# Patient Record
Sex: FEMALE | Race: WHITE | Employment: FULL TIME | URBAN - METROPOLITAN AREA
[De-identification: names, ages, dates, MRNs, and addresses within clinical notes are randomized per-mention and may not be internally consistent; named-entity substitution may affect disease eponyms.]

---

## 2017-01-10 ENCOUNTER — ALLSCRIPTS OFFICE VISIT (OUTPATIENT)
Dept: OTHER | Facility: OTHER | Age: 47
End: 2017-01-10

## 2017-03-31 ENCOUNTER — ALLSCRIPTS OFFICE VISIT (OUTPATIENT)
Dept: OTHER | Facility: OTHER | Age: 47
End: 2017-03-31

## 2017-04-06 ENCOUNTER — LAB CONVERSION - ENCOUNTER (OUTPATIENT)
Dept: OTHER | Facility: OTHER | Age: 47
End: 2017-04-06

## 2017-04-06 LAB
ADDITIONAL INFORMATION (HISTORICAL): NORMAL
ADEQUACY: (HISTORICAL): NORMAL
COMMENT (HISTORICAL): NORMAL
CYTOTECHNOLOGIST: (HISTORICAL): NORMAL
HPV MRNA E6/E7 (HISTORICAL): NOT DETECTED
INTERPRETATION (HISTORICAL): NORMAL
LMP (HISTORICAL): NORMAL
PREV. BX: (HISTORICAL): NORMAL
PREV. PAP (HISTORICAL): NORMAL
SOURCE (HISTORICAL): NORMAL

## 2017-04-14 ENCOUNTER — GENERIC CONVERSION - ENCOUNTER (OUTPATIENT)
Dept: OTHER | Facility: OTHER | Age: 47
End: 2017-04-14

## 2017-04-30 DIAGNOSIS — Z12.31 ENCOUNTER FOR SCREENING MAMMOGRAM FOR MALIGNANT NEOPLASM OF BREAST: ICD-10-CM

## 2018-01-13 VITALS
HEIGHT: 59 IN | WEIGHT: 128.5 LBS | DIASTOLIC BLOOD PRESSURE: 78 MMHG | SYSTOLIC BLOOD PRESSURE: 116 MMHG | BODY MASS INDEX: 25.91 KG/M2

## 2018-01-15 VITALS
WEIGHT: 150 LBS | BODY MASS INDEX: 30.24 KG/M2 | DIASTOLIC BLOOD PRESSURE: 82 MMHG | HEIGHT: 59 IN | SYSTOLIC BLOOD PRESSURE: 120 MMHG

## 2018-03-20 ENCOUNTER — OFFICE VISIT (OUTPATIENT)
Dept: OBGYN CLINIC | Facility: CLINIC | Age: 48
End: 2018-03-20
Payer: COMMERCIAL

## 2018-03-20 VITALS
SYSTOLIC BLOOD PRESSURE: 122 MMHG | WEIGHT: 122.8 LBS | HEIGHT: 60 IN | BODY MASS INDEX: 24.11 KG/M2 | DIASTOLIC BLOOD PRESSURE: 78 MMHG

## 2018-03-20 DIAGNOSIS — Z12.39 BREAST CANCER SCREENING: ICD-10-CM

## 2018-03-20 DIAGNOSIS — Z01.419 WOMEN'S ANNUAL ROUTINE GYNECOLOGICAL EXAMINATION: Primary | ICD-10-CM

## 2018-03-20 PROCEDURE — 99396 PREV VISIT EST AGE 40-64: CPT | Performed by: OBSTETRICS & GYNECOLOGY

## 2018-03-20 RX ORDER — OMEPRAZOLE 40 MG/1
40 CAPSULE, DELAYED RELEASE ORAL DAILY
Refills: 2 | COMMUNITY
Start: 2018-01-23

## 2018-03-20 RX ORDER — BIOTIN 1 MG
TABLET ORAL
COMMUNITY

## 2018-03-20 RX ORDER — PNV NO.95/FERROUS FUM/FOLIC AC 28MG-0.8MG
TABLET ORAL
COMMUNITY

## 2018-03-20 RX ORDER — UBIDECARENONE 75 MG
CAPSULE ORAL
COMMUNITY

## 2018-03-20 NOTE — PROGRESS NOTES
Subjective  HPI:     Adeel Serrano is a 50 y o  female  She is a  2 Para 2, with 2 prior  sections  Her menstrual cycles are regular and predictable, heavy for one day lasting 3 days  Her current method of contraception includes tubal ligation  She denies hot flashes, night sweats, vaginal dryness  She has been experiencing some sleep disturbances  She denies issues with intimacy  She denies Gu/ Gyn complaints  Has been experiening loose stools  She has been seen by GI all testing is benign  She denies depression/anxiety  She denies any new medical/family stressors/illnesses to report   Her dental care is up-to-date  Gynecologic History    Patient's last menstrual period was 2018 (exact date)  Last Pap: 3/31/18  Results were: normal  Last mammogram: 17  Results were: normal  Colonoscopy in 2017 - Normal      Obstetric History    OB History    Para Term  AB Living   2 2       2   SAB TAB Ectopic Multiple Live Births           2      # Outcome Date GA Lbr Yasmany/2nd Weight Sex Delivery Anes PTL Lv   2 Para 99    F CS-LTranv      1 Para 03/15/96    F CS-LTranv             The following portions of the patient's history were reviewed and updated as appropriate: allergies, current medications, past family history, past medical history, past social history, past surgical history and problem list     Review of Systems    Pertinent items are noted in HPI  Objective    Physical Exam   Constitutional: Vital signs are normal  She appears well-developed and well-nourished  Genitourinary: Vagina normal and uterus normal  Pelvic exam was performed with patient supine  There is no rash, tenderness, lesion or Bartholin's cyst on the right labia  There is no rash, tenderness, lesion or Bartholin's cyst on the left labia  Right adnexum does not display mass, does not display tenderness and does not display fullness   Left adnexum does not display mass, does not display tenderness and does not display fullness  Cervix is not parous  Cervix does not exhibit motion tenderness, lesion, discharge, friability, polyp or nabothian cyst      Uterus is anteverted  HENT:   Head: Normocephalic and atraumatic  Neck: Neck supple  No thyromegaly present  Cardiovascular: Normal rate, regular rhythm, S1 normal, S2 normal and normal heart sounds  Pulmonary/Chest: Effort normal and breath sounds normal  Right breast exhibits no inverted nipple, no mass, no nipple discharge, no skin change and no tenderness  Left breast exhibits no inverted nipple, no mass, no nipple discharge, no skin change and no tenderness  Abdominal: Soft  Bowel sounds are normal  She exhibits no distension and no mass  There is no tenderness  There is no guarding  Lymphadenopathy:     She has no cervical adenopathy  She has no axillary adenopathy  Neurological: She is alert  Skin: Skin is warm  Psychiatric: She has a normal mood and affect  Nursing note and vitals reviewed  Assessment and Plan    Patient informed of a Stable GYN exam  A pap smear was not performed due to negative HPV pap in 2017  The current ASCCP guidelines were reviewed  The low risk patient will receive pap smear screening every 3 years or pap with HPV co-testing every 5 years  I emphasized the importance of an annual pelvic and breast exam  A yearly mammogram is recommended for breast cancer screening starting at age 36  She is scheduled for her mammogram next month  All questions have been answered to her satisfaction  Mammogram ordered  Follow up in: 1 year or sooner if needed

## 2018-07-11 ENCOUNTER — TELEPHONE (OUTPATIENT)
Dept: OBGYN CLINIC | Facility: CLINIC | Age: 48
End: 2018-07-11

## 2018-07-11 NOTE — TELEPHONE ENCOUNTER
Pt said she is experiencing a lot of anxiety believed to be related to menopause  Would like to know if there is something she can take

## 2018-07-11 NOTE — TELEPHONE ENCOUNTER
Pt here for yearly appt 3/20/18 - menses have been q 6 wks - having some heat flashes @ night & feels anxious when this is happening  Has some increased work stress  Inquiring about low dose med for anxiety  Do you want to tx or have her f/u with pcp?

## 2019-04-01 ENCOUNTER — ANNUAL EXAM (OUTPATIENT)
Dept: OBGYN CLINIC | Facility: CLINIC | Age: 49
End: 2019-04-01
Payer: COMMERCIAL

## 2019-04-01 VITALS
WEIGHT: 125.4 LBS | DIASTOLIC BLOOD PRESSURE: 80 MMHG | BODY MASS INDEX: 24.62 KG/M2 | HEIGHT: 60 IN | SYSTOLIC BLOOD PRESSURE: 110 MMHG

## 2019-04-01 DIAGNOSIS — Z01.419 WOMEN'S ANNUAL ROUTINE GYNECOLOGICAL EXAMINATION: Primary | ICD-10-CM

## 2019-04-01 DIAGNOSIS — Z12.39 BREAST CANCER SCREENING: ICD-10-CM

## 2019-04-01 PROCEDURE — 99396 PREV VISIT EST AGE 40-64: CPT | Performed by: OBSTETRICS & GYNECOLOGY

## 2019-11-18 ENCOUNTER — TELEPHONE (OUTPATIENT)
Dept: OBGYN CLINIC | Facility: CLINIC | Age: 49
End: 2019-11-18

## 2019-11-18 NOTE — TELEPHONE ENCOUNTER
Pt here for yearly 4/1/19 - having increased hot flashes day & night past month - was taking Black Cohosh - not effective  Is inquiring about taking Effexor 37 5 mg as someone had recom this to her   should she schedule appt?

## 2019-11-20 NOTE — TELEPHONE ENCOUNTER
I spoke with the patient today, about her hot flashes and night sweats  Will try over-the-counter Estroven  This is not effective we may try a low-dose hormone replacement therapy program we will not do Effexor

## 2023-06-21 DIAGNOSIS — Z12.31 ENCOUNTER FOR SCREENING MAMMOGRAM FOR BREAST CANCER: ICD-10-CM
